# Patient Record
Sex: MALE | Race: WHITE | Employment: OTHER | URBAN - METROPOLITAN AREA
[De-identification: names, ages, dates, MRNs, and addresses within clinical notes are randomized per-mention and may not be internally consistent; named-entity substitution may affect disease eponyms.]

---

## 2020-03-23 ENCOUNTER — ASSISTED LIVING VISIT (OUTPATIENT)
Dept: GERIATRICS | Facility: CLINIC | Age: 80
End: 2020-03-23
Payer: COMMERCIAL

## 2020-03-23 DIAGNOSIS — I73.9 PVD (PERIPHERAL VASCULAR DISEASE) (H): ICD-10-CM

## 2020-03-23 DIAGNOSIS — J44.9 CHRONIC OBSTRUCTIVE PULMONARY DISEASE, UNSPECIFIED COPD TYPE (H): ICD-10-CM

## 2020-03-23 DIAGNOSIS — R57.8 HEMORRHAGIC SHOCK (H): Primary | ICD-10-CM

## 2020-03-23 DIAGNOSIS — N18.30 STAGE 3 CHRONIC KIDNEY DISEASE (H): ICD-10-CM

## 2020-03-23 DIAGNOSIS — E11.9 TYPE 2 DIABETES MELLITUS WITHOUT COMPLICATION, WITHOUT LONG-TERM CURRENT USE OF INSULIN (H): ICD-10-CM

## 2020-03-23 DIAGNOSIS — F33.0 MILD EPISODE OF RECURRENT MAJOR DEPRESSIVE DISORDER (H): ICD-10-CM

## 2020-03-23 DIAGNOSIS — I25.10 ATHEROSCLEROSIS OF NATIVE CORONARY ARTERY OF NATIVE HEART WITHOUT ANGINA PECTORIS: ICD-10-CM

## 2020-03-23 DIAGNOSIS — K26.9 DUODENAL ULCER: ICD-10-CM

## 2020-03-23 PROBLEM — M62.82 RHABDOMYOLYSIS: Status: ACTIVE | Noted: 2020-03-23

## 2020-03-23 PROBLEM — R26.2 DIFFICULTY IN WALKING: Status: ACTIVE | Noted: 2020-03-23

## 2020-03-23 PROBLEM — D62 ACUTE POSTHEMORRHAGIC ANEMIA: Status: ACTIVE | Noted: 2020-03-23

## 2020-03-23 PROBLEM — N18.9 CHRONIC KIDNEY DISEASE: Status: ACTIVE | Noted: 2020-03-23

## 2020-03-23 PROBLEM — K26.0 ACUTE DUODENAL ULCER WITH HEMORRHAGE: Status: ACTIVE | Noted: 2020-03-23

## 2020-03-23 PROBLEM — R68.0 HYPOTHERMIA NOT ASSOCIATED WITH LOW ENVIRONMENTAL TEMPERATURE: Status: ACTIVE | Noted: 2020-03-23

## 2020-03-23 PROBLEM — K92.2 GASTROINTESTINAL HEMORRHAGE: Status: ACTIVE | Noted: 2020-03-23

## 2020-03-23 PROBLEM — N17.9 ACUTE KIDNEY FAILURE (H): Status: ACTIVE | Noted: 2020-03-23

## 2020-03-23 PROBLEM — M62.81 MUSCLE WEAKNESS (GENERALIZED): Status: ACTIVE | Noted: 2020-03-23

## 2020-03-23 PROBLEM — F33.9 RECURRENT MAJOR DEPRESSIVE DISORDER (H): Status: ACTIVE | Noted: 2020-03-23

## 2020-03-23 RX ORDER — POLYETHYLENE GLYCOL 3350 17 G/17G
1 POWDER, FOR SOLUTION ORAL DAILY PRN
COMMUNITY

## 2020-03-23 RX ORDER — BISACODYL 10 MG
10 SUPPOSITORY, RECTAL RECTAL DAILY PRN
COMMUNITY
End: 2020-04-09

## 2020-03-23 RX ORDER — AMLODIPINE BESYLATE 2.5 MG/1
2.5 TABLET ORAL DAILY
COMMUNITY

## 2020-03-23 RX ORDER — MIDODRINE HYDROCHLORIDE 2.5 MG/1
2.5 TABLET ORAL 3 TIMES DAILY PRN
COMMUNITY
End: 2020-04-09

## 2020-03-23 RX ORDER — CLOPIDOGREL BISULFATE 75 MG/1
75 TABLET ORAL DAILY
COMMUNITY

## 2020-03-23 RX ORDER — AMOXICILLIN 250 MG
1 CAPSULE ORAL 2 TIMES DAILY PRN
COMMUNITY

## 2020-03-23 RX ORDER — ATORVASTATIN CALCIUM 40 MG/1
40 TABLET, FILM COATED ORAL DAILY
COMMUNITY

## 2020-03-23 RX ORDER — ALBUTEROL SULFATE 0.83 MG/ML
2.5 SOLUTION RESPIRATORY (INHALATION) EVERY 4 HOURS PRN
COMMUNITY

## 2020-03-23 RX ORDER — LOPERAMIDE HCL 2 MG
2 CAPSULE ORAL EVERY 4 HOURS PRN
COMMUNITY

## 2020-03-23 NOTE — PROGRESS NOTES
[facility]  PRIMARY CARE PROVIDER AND CLINIC:  Luis Jackson, SYEDA CNP, [address] [place] [address] [address] / [facility] [address]  Chief Complaint   Patient presents with     Establish Care     [facility] Medical Record Number:  [id number]  Place of Service where encounter took place:  [facility] ([facility]) [[id number]]    Cali Tucker  is a 79 year old  (1940), admitted to the above facility from [facility]..  Admitted to this facility for  rehab, medical management and nursing care.    HPI:    HPI information obtained from: facility chart records, facility staff, patient report and [facility] chart review.   Brief Summary of Hospital Course: Patient was at [facility] 9/11-9/19 and 9/24-9/26 for GI bleed, duodenal ulcer and hemorrhagic shock, EGD showed non-bleeding ulcers in stomach and duodenum, Hgb dropped to 7.2, transfused total 4u PRBC's, ulcers injected and clipped, stabilized with Hgb's in the 8.6-8.9 range, started on PPI, resumed plavix and ASA, sent to [facility] and followed by Annika team, now has transferred to [facility] and to be followed by [facility].   Updates on Status Since Skilled nursing Admission: Patient resided in own home, transferred to [facility], then to [facility], now at [facility], pleasant, no s/s depression, states numerous times wanting to move back home and drive again, moderate cognitive limitations, poor historian, physically stable albeit weak, previous cigarette smoker quit in September 2019, weight loss, denies current complaints, overall status is stable.     CODE STATUS/ADVANCE DIRECTIVES DISCUSSION:   Per facility chart  Patient's living condition: lives in an assisted living facility  ALLERGIES: Patient has no allergy information on record.  PAST MEDICAL HISTORY:  has a past medical history of Abdominal aortic aneurysm (AAA) (H), Cerebral infarction (H), Hypovolemic shock (H), Major depressive disorder, Personal history of other diseases of the  circulatory system, Personal history of traumatic fracture, Problem related to life-management difficulty, and Urinary retention.  PAST SURGICAL HISTORY:   has no past surgical history on file.  FAMILY HISTORY: family history is not on file.  SOCIAL HISTORY:       Post Discharge Medication Reconciliation Status: discharge medications reconciled, continue medications without change    Current Outpatient Medications   Medication Sig Dispense Refill     albuterol (PROVENTIL) (2.5 MG/3ML) 0.083% neb solution Take 2.5 mg by nebulization 2 times daily And every 4 hours as needed       amLODIPine (NORVASC) 2.5 MG tablet Take 2.5 mg by mouth daily       atorvastatin (LIPITOR) 40 MG tablet Take 40 mg by mouth daily       bisacodyl (DULCOLAX) 10 MG suppository Place 10 mg rectally daily as needed for constipation       clopidogrel (PLAVIX) 75 MG tablet Take 75 mg by mouth daily       loperamide (IMODIUM) 2 MG capsule Take 2 mg by mouth every 4 hours as needed for diarrhea       midodrine (PROAMATINE) 2.5 MG tablet Take 2.5 mg by mouth 3 times daily as needed       polyethylene glycol (MIRALAX) packet Take 1 packet by mouth daily as needed for constipation       senna-docusate (SENOKOT-S/PERICOLACE) 8.6-50 MG tablet Take 1 tablet by mouth 2 times daily as needed for constipation       sertraline (ZOLOFT) 50 MG tablet Take 50 mg by mouth At Bedtime       umeclidinium (INCRUSE ELLIPTA) 62.5 MCG/INH inhaler Inhale 1 puff into the lungs daily           ROS:  4 point ROS including Respiratory, CV, GI and , other than that noted in the HPI,  is negative    Vitals:  There were no vitals taken for this visit.  Exam:  GENERAL APPEARANCE:  Alert, in no distress, appears healthy  ENT:  Mouth and posterior oropharynx normal, moist mucous membranes, normal hearing acuity  RESP:  lungs clear to auscultation   CV:  regular rate and rhythm, no murmur, rub, or gallop, no edema  ABDOMEN:  bowel sounds normal  M/S:   Gait and station abnormal  using WC and walkwer  SKIN:  Inspection of skin and subcutaneous tissueWNL, misc bruising UE's  NEURO:   Examination of sensation by touch normal  PSYCH:  oriented to self, memory impaired     Lab/Diagnostic data:  Labs done in SNF are in [place] EPIC. Please refer to them using EPIC/Care Everywhere.    ASSESSMENT/PLAN:  Hemorrhagic shock (H)  Duodenal ulcer  [facility]  -appetite/intake appropriate  -continue plavix 75mg,   -PPI previously discontinued  -no acute concerns  -Hgb follow up on 3/30    Atherosclerosis of native coronary artery of native heart without angina pectoris  -previous stenting completed in 2017  -statin reduced from 80mg to 40mg  -continue atorvastatin 40mg, plavix 75mg  -follow up cardiology PRN    PVD (peripheral vascular disease) (H)  -GERI's in L SFA and popliteal arteries in 2018  -no edema, extremities perfusing well, warm, PP+, no edema  -staff to follow weights, edema and report issues    Chronic obstructive pulmonary disease, unspecified COPD type (H)  -no SOB, pulmonary status stable  -continue albuterol neb BID and PRN, Ellipta daily  -staff to follow, report SOB    Type 2 diabetes mellitus without complication, without long-term current use of insulin (H)  -historical, no recent A1C on file, no BG's  -A1C on 3/30    Stage 3 chronic kidney disease (H)  -creat/GFR on 9/24/19 were 1.57, 43  -improved on 9/25 ro 0.90/>60  -dose medications renally as possible  -BMP on 3/30    Mild episode of recurrent major depressive disorder (H)  -no overt s/s depression  -previously on wellbutrin, was weaned and dc'd   -previously on depakote, was weaned and dc'd  -patient states wanting to go home and drive again  -continue sertraline 50mg daily  -in-house psych to follow           Electronically signed by:  SYEDA Bynum CNP

## 2020-03-23 NOTE — LETTER
3/23/2020        RE: [facility]  [address] [facility] [address]        [facility]  PRIMARY CARE PROVIDER AND CLINIC:  SYEDA Bynum CNP, [address] [place] [address] [address] / [facility] [address]  Chief Complaint   Patient presents with     Establish Care     [place] Medical Record Number:  [id number]  Place of Service where encounter took place:  [facility] [[id number]]    Cali Tucker  is a 79 year old  (1940), admitted to the above facility from [facility]..  Admitted to this facility for  rehab, medical management and nursing care.    HPI:    HPI information obtained from: facility chart records, facility staff, patient report and [facility] chart review.   Brief Summary of Hospital Course: Patient was at [facility] 9/11-9/19 and 9/24-9/26 for GI bleed, duodenal ulcer and hemorrhagic shock, EGD showed non-bleeding ulcers in stomach and duodenum, Hgb dropped to 7.2, transfused total 4u PRBC's, ulcers injected and clipped, stabilized with Hgb's in the 8.6-8.9 range, started on PPI, resumed plavix and ASA, sent to [facility] and followed by Annika team, now has transferred to [facility] and to be followed by [facility].   Updates on Status Since Skilled nursing Admission: Patient resided in own home, transferred to [facility], then to [facility], now at [facility], pleasant, no s/s depression, states numerous times wanting to move back home and drive again, moderate cognitive limitations, poor historian, physically stable albeit weak, previous cigarette smoker quit in September 2019, weight loss, denies current complaints, overall status is stable.     CODE STATUS/ADVANCE DIRECTIVES DISCUSSION:   Per facility chart  Patient's living condition: lives in an assisted living facility  ALLERGIES: Patient has no allergy information on record.  PAST MEDICAL HISTORY:  has a past medical history of Abdominal aortic aneurysm (AAA) (H),  Cerebral infarction (H), Hypovolemic shock (H), Major depressive disorder, Personal history of other diseases of the circulatory system, Personal history of traumatic fracture, Problem related to life-management difficulty, and Urinary retention.  PAST SURGICAL HISTORY:   has no past surgical history on file.  FAMILY HISTORY: family history is not on file.  SOCIAL HISTORY:       Post Discharge Medication Reconciliation Status: discharge medications reconciled, continue medications without change    Current Outpatient Medications   Medication Sig Dispense Refill     albuterol (PROVENTIL) (2.5 MG/3ML) 0.083% neb solution Take 2.5 mg by nebulization 2 times daily And every 4 hours as needed       amLODIPine (NORVASC) 2.5 MG tablet Take 2.5 mg by mouth daily       atorvastatin (LIPITOR) 40 MG tablet Take 40 mg by mouth daily       bisacodyl (DULCOLAX) 10 MG suppository Place 10 mg rectally daily as needed for constipation       clopidogrel (PLAVIX) 75 MG tablet Take 75 mg by mouth daily       loperamide (IMODIUM) 2 MG capsule Take 2 mg by mouth every 4 hours as needed for diarrhea       midodrine (PROAMATINE) 2.5 MG tablet Take 2.5 mg by mouth 3 times daily as needed       polyethylene glycol (MIRALAX) packet Take 1 packet by mouth daily as needed for constipation       senna-docusate (SENOKOT-S/PERICOLACE) 8.6-50 MG tablet Take 1 tablet by mouth 2 times daily as needed for constipation       sertraline (ZOLOFT) 50 MG tablet Take 50 mg by mouth At Bedtime       umeclidinium (INCRUSE ELLIPTA) 62.5 MCG/INH inhaler Inhale 1 puff into the lungs daily           ROS:  4 point ROS including Respiratory, CV, GI and , other than that noted in the HPI,  is negative    Vitals:  There were no vitals taken for this visit.  Exam:  GENERAL APPEARANCE:  Alert, in no distress, appears healthy  ENT:  Mouth and posterior oropharynx normal, moist mucous membranes, normal hearing acuity  RESP:  lungs clear to auscultation   CV:  regular  rate and rhythm, no murmur, rub, or gallop, no edema  ABDOMEN:  bowel sounds normal  M/S:   Gait and station abnormal using WC and walkwer  SKIN:  Inspection of skin and subcutaneous tissueWNL, misc bruising UE's  NEURO:   Examination of sensation by touch normal  PSYCH:  oriented to self, memory impaired     Lab/Diagnostic data:  Labs done in SNF are in [place] EPIC. Please refer to them using EPIC/Care Everywhere.    ASSESSMENT/PLAN:  Hemorrhagic shock (H)  Duodenal ulcer  -hospitalized [facility] in Sept  -appetite/intake appropriate  -continue plavix 75mg,   -PPI previously discontinued  -no acute concerns  -Hgb follow up on 3/30    Atherosclerosis of native coronary artery of native heart without angina pectoris  -previous stenting completed in 2017  -statin reduced from 80mg to 40mg  -continue atorvastatin 40mg, plavix 75mg  -follow up cardiology PRN    PVD (peripheral vascular disease) (H)  -GERI's in L SFA and popliteal arteries in 2018  -no edema, extremities perfusing well, warm, PP+, no edema  -staff to follow weights, edema and report issues    Chronic obstructive pulmonary disease, unspecified COPD type (H)  -no SOB, pulmonary status stable  -continue albuterol neb BID and PRN, Ellipta daily  -staff to follow, report SOB    Type 2 diabetes mellitus without complication, without long-term current use of insulin (H)  -historical, no recent A1C on file, no BG's  -A1C on 3/30    Stage 3 chronic kidney disease (H)  -creat/GFR on 9/24/19 were 1.57, 43  -improved on 9/25 ro 0.90/>60  -dose medications renally as possible  -BMP on 3/30    Mild episode of recurrent major depressive disorder (H)  -no overt s/s depression  -previously on wellbutrin, was weaned and dc'd   -previously on depakote, was weaned and dc'd  -patient states wanting to go home and drive again  -continue sertraline 50mg daily  -in-house psych to follow           Electronically signed by:  SYEDA Bynum CNP                          Sincerely,        SYEDA Bynum CNP

## 2020-03-30 ENCOUNTER — TRANSFERRED RECORDS (OUTPATIENT)
Dept: HEALTH INFORMATION MANAGEMENT | Facility: CLINIC | Age: 80
End: 2020-03-30

## 2020-03-30 LAB
ANION GAP SERPL CALCULATED.3IONS-SCNC: 10 MMOL/L (ref 7–16)
BUN SERPL-MCNC: 16 MG/DL (ref 7–26)
CALCIUM SERPL-MCNC: 8.3 MG/DL (ref 8.4–10.4)
CHLORIDE SERPLBLD-SCNC: 107 MMOL/L (ref 98–109)
CO2 SERPL-SCNC: 24 MMOL/L (ref 20–29)
CREAT SERPL-MCNC: 0.95 MG/DL (ref 0.73–1.18)
GFR SERPL CREATININE-BSD FRML MDRD: >60 ML/MIN/1.73M2
GLUCOSE SERPL-MCNC: 88 MG/DL (ref 70–100)
HBA1C MFR BLD: 5.2 % (ref 0–5.7)
HEMOGLOBIN: 11.1 G/DL (ref 13.5–17.5)
POTASSIUM SERPL-SCNC: 3.8 MMOL/L (ref 3.5–5.1)
SODIUM SERPL-SCNC: 141 MMOL/L (ref 136–145)

## 2020-03-31 ENCOUNTER — TRANSFERRED RECORDS (OUTPATIENT)
Dept: HEALTH INFORMATION MANAGEMENT | Facility: CLINIC | Age: 80
End: 2020-03-31

## 2020-03-31 LAB — ALT SERPL-CCNC: 17 U/L (ref 0–55)

## 2020-04-08 NOTE — PROGRESS NOTES
"[facility]   Cali Tucker is being evaluated via a billable video visit due to the restrictions of the Covid-19 pandemic.   The patient has been notified of following:  \"This video visit will be conducted via a call between you and your provider. We have found that certain health care needs can be provided without the need for an in-person physical exam.  This service lets us provide the care you need with a video conversation. If during the course of the call the provider feels a video visit is not appropriate, you will not be charged for this service.\"   The provider has received verbal consent for a Video Visit from the patient or first contact? Yes  Patient  or facility staff would like the video invitation sent by: N/A   Video Start Time: 12:35    [place] Medical Record Number:  [id number]  Place of Location at the time of visit: [facility]   Chief Complaint   Patient presents with     P Care Coordination - Initial Contact/New Enrollment       The health plan new enrollment has happened. I have reviewed the  MDS, the preventative needs,  and facility care plan. The level of care is appropriate. I have reviewed the code status/advanced directives.     HPI:  Cali Tucker  is a 79 year old (1940), who is being seen today for a visit.  HPI information obtained from: facility chart records, facility staff, patient report and [facility] chart review. Today's concern is:     Chronic obstructive pulmonary disease, unspecified COPD type (H)  Duodenal ulcer  Mild episode of recurrent major depressive disorder (H)     Patient seen in room with Ipad and nurse Carlin for follow up, pleasant, fairly independent, moderate cognitive limitations, still states wanting to move home and play golf, limited safety awareness, VS stable, intake excellent, no overt s/s depression, no SOB with activity, overall status is stable.     Past Medical and Surgical History reviewed in Epic " today.  MEDICATIONS:    Current Outpatient Medications   Medication Sig Dispense Refill     albuterol (PROVENTIL) (2.5 MG/3ML) 0.083% neb solution Take 2.5 mg by nebulization 2 times daily And every 4 hours as needed       amLODIPine (NORVASC) 2.5 MG tablet Take 2.5 mg by mouth daily       atorvastatin (LIPITOR) 40 MG tablet Take 40 mg by mouth daily       bisacodyl (DULCOLAX) 10 MG suppository Place 10 mg rectally daily as needed for constipation       clopidogrel (PLAVIX) 75 MG tablet Take 75 mg by mouth daily       loperamide (IMODIUM) 2 MG capsule Take 2 mg by mouth every 4 hours as needed for diarrhea       midodrine (PROAMATINE) 2.5 MG tablet Take 2.5 mg by mouth 3 times daily as needed       polyethylene glycol (MIRALAX) packet Take 1 packet by mouth daily as needed for constipation       senna-docusate (SENOKOT-S/PERICOLACE) 8.6-50 MG tablet Take 1 tablet by mouth 2 times daily as needed for constipation       sertraline (ZOLOFT) 50 MG tablet Take 50 mg by mouth At Bedtime       umeclidinium (INCRUSE ELLIPTA) 62.5 MCG/INH inhaler Inhale 1 puff into the lungs daily       REVIEW OF SYSTEMS: 4 point ROS including Respiratory, CV, GI and , other than that noted in the HPI,  is negative  Objective: There were no vitals taken for this visit.  Limited visit exam done given COVID-19 precautions.   GENERAL APPEARANCE:  Alert, in no distress, appears healthy  ENT:  Mouth and posterior oropharynx normal, moist mucous membranes  RESP:  lungs clear to auscultation   CV:  regular rate and rhythm, no murmur, rub, or gallop, no edema  ABDOMEN:  bowel sounds normal  M/S:   Gait and station abnormal using walker  SKIN:  Inspection of skin and subcutaneous tissue baseline  NEURO:   Examination of sensation by touch normal  PSYCH:  oriented to self, memory impaired   Labs:   Labs done in SNF are in [place] EPIC. Please refer to them using [place]/Care Everywhere.    ASSESSMENT/PLAN:  Chronic obstructive pulmonary disease,  unspecified COPD type (H)  -no SOB even with activity  -continue incruse daily  -staff to monitor status  -changing albuterol neb to PRN only    Duodenal ulcer  -no gut issues, adequate intake and output  -staff to monitor    Mild episode of recurrent major depressive disorder (H)  -no overt s/s depression, previously weaned depakote and wellbutrin  -status stable  -consider PHQ9/GDS testing as indicated      Electronically signed by:  SYEDA Bynum CNP     Video-Visit Details  Type of service:  Video Visit  Video End Time (time video stopped): 12:45  Distant Location (provider location):  [facility]

## 2020-04-09 ENCOUNTER — VIRTUAL VISIT (OUTPATIENT)
Dept: GERIATRICS | Facility: CLINIC | Age: 80
End: 2020-04-09
Payer: COMMERCIAL

## 2020-04-09 DIAGNOSIS — K26.9 DUODENAL ULCER: ICD-10-CM

## 2020-04-09 DIAGNOSIS — J44.9 CHRONIC OBSTRUCTIVE PULMONARY DISEASE, UNSPECIFIED COPD TYPE (H): Primary | ICD-10-CM

## 2020-04-09 DIAGNOSIS — F33.0 MILD EPISODE OF RECURRENT MAJOR DEPRESSIVE DISORDER (H): ICD-10-CM

## 2020-04-09 NOTE — LETTER
"    4/9/2020        RE: [facility]  [address] [facility] [address]        [place] [facility]   Cali Tucker is being evaluated via a billable video visit due to the restrictions of the Covid-19 pandemic.   The patient has been notified of following:  ***\"This video visit will be conducted via a call between you and your provider. We have found that certain health care needs can be provided without the need for an in-person physical exam.  This service lets us provide the care you need with a video conversation. If during the course of the call the provider feels a video visit is not appropriate, you will not be charged for this service.\"   The provider has received verbal consent for a Video Visit from the patient or first contact? {YES-NO  Default Yes:4444::\"Yes\"}  Patient  or facility staff would like the video invitation sent by: {[facility]gs video visit invitation:212871::\"N/A \"}  Video Start Time: {video visit start time:152948}    [place] Medical Record Number:  [id number]  Place of Location at the time of visit: [facility]   Chief Complaint   Patient presents with     [facility] Care Coordination - Initial Contact/New Enrollment       The health plan new enrollment has happened. I have reviewed the  MDS, the preventative needs,  and facility care plan. The level of care is appropriate. I have reviewed the code status/advanced directives.     HPI:  Cali Tucker  is a 79 year old (1940), who is being seen today for a visit.  HPI information obtained from: {[facility] HPI:988165}. Today's concern is:  {[facility] DX:486633}    Past Medical and Surgical History reviewed in Epic today.  MEDICATIONS:  {Providers Please double check the med list (in the plan section >> meds & orders tab) and Discontinue any of the meds flagged by the TC to be discontinued}  Current Outpatient Medications   Medication Sig Dispense Refill     albuterol (PROVENTIL) (2.5 MG/3ML) 0.083% neb " solution Take 2.5 mg by nebulization 2 times daily And every 4 hours as needed       amLODIPine (NORVASC) 2.5 MG tablet Take 2.5 mg by mouth daily       atorvastatin (LIPITOR) 40 MG tablet Take 40 mg by mouth daily       bisacodyl (DULCOLAX) 10 MG suppository Place 10 mg rectally daily as needed for constipation       clopidogrel (PLAVIX) 75 MG tablet Take 75 mg by mouth daily       loperamide (IMODIUM) 2 MG capsule Take 2 mg by mouth every 4 hours as needed for diarrhea       midodrine (PROAMATINE) 2.5 MG tablet Take 2.5 mg by mouth 3 times daily as needed       polyethylene glycol (MIRALAX) packet Take 1 packet by mouth daily as needed for constipation       senna-docusate (SENOKOT-S/PERICOLACE) 8.6-50 MG tablet Take 1 tablet by mouth 2 times daily as needed for constipation       sertraline (ZOLOFT) 50 MG tablet Take 50 mg by mouth At Bedtime       umeclidinium (INCRUSE ELLIPTA) 62.5 MCG/INH inhaler Inhale 1 puff into the lungs daily     ***  REVIEW OF SYSTEMS: {ROS FGS:279269}  Objective: There were no vitals taken for this visit.  Limited visit exam done given COVID-19 precautions.   {Nursing home physical exam :563297}  Labs:   {fgslab:640166}    ASSESSMENT/PLAN:  {F[facility] DX:700311}    {fgsorders:048887}  ***  Electronically signed by:  Keila Moya ***  {Providers Please double check the med list (in the plan section >> meds & orders tab) and Discontinue any of the meds flagged by the TC to be discontinued}  Video-Visit Details  Type of service:  Video Visit  Video End Time (time video stopped): ***  Distant Location (provider location):  [place] [facility]             Sincerely,        SYEDA Bynum CNP

## 2020-04-20 ENCOUNTER — VIRTUAL VISIT (OUTPATIENT)
Dept: GERIATRICS | Facility: CLINIC | Age: 80
End: 2020-04-20
Payer: COMMERCIAL

## 2020-04-20 DIAGNOSIS — Z91.89 AT RISK FOR ELOPEMENT: Primary | ICD-10-CM

## 2020-04-20 DIAGNOSIS — R41.89 COGNITIVE DECLINE: ICD-10-CM

## 2020-04-20 NOTE — LETTER
"    4/20/2020        RE: [facility]  [address] [facility] [address]        Telephone visit  [facility] [facility]  Cali Tucker is a 79 year old year old adult who is being evaluated via a billable telephone visit due to the restrictions of the Covid-19 pandemic. The patient has been notified of following: ***\"This telephone visit will be conducted via a call between you and your provider. We have found that certain health care needs can be provided without the need for a physical exam. This service lets us provide the care you need with a short phone conversation. If a prescription is necessary we will work with the facility you are at and can send it directly to your pharmacy. If lab work is needed we can place an order to have it drawn at the facility you are at. If during the course of the call the provider feels a telephone visit is not appropriate, you will not be charged for this service.\"   Patient or Patient's first contact has given verbal consent for Telephone visit?  {YES-NO  Default Yes:4444::\"Yes\"}  Place of Location at the time of visit[facility]     Cali Tucker complains of :   Chief Complaint   Patient presents with     RECHECK     I have reviewed and updated the patient's Past Medical History, Social History, Family History and Medication List.  ALLERGIES: No Known Allergies   HPI: HPI information obtained from: {[facility] HPI:473107}.  ***    MEDICATIONS:  {Providers Please double check the med list (in the plan section >> meds & orders tab) and Discontinue any of the meds flagged by the TC to be discontinued}  Current Outpatient Medications   Medication Sig Dispense Refill     albuterol (PROVENTIL) (2.5 MG/3ML) 0.083% neb solution Take 2.5 mg by nebulization every 4 hours as needed         amLODIPine (NORVASC) 2.5 MG tablet Take 2.5 mg by mouth daily       atorvastatin (LIPITOR) 40 MG tablet Take 40 mg by mouth daily   "     clopidogrel (PLAVIX) 75 MG tablet Take 75 mg by mouth daily       loperamide (IMODIUM) 2 MG capsule Take 2 mg by mouth every 4 hours as needed for diarrhea       polyethylene glycol (MIRALAX) packet Take 1 packet by mouth daily as needed for constipation       senna-docusate (SENOKOT-S/PERICOLACE) 8.6-50 MG tablet Take 1 tablet by mouth 2 times daily as needed for constipation       sertraline (ZOLOFT) 50 MG tablet Take 50 mg by mouth At Bedtime       umeclidinium (INCRUSE ELLIPTA) 62.5 MCG/INH inhaler Inhale 1 puff into the lungs daily     ***  REVIEW OF SYSTEMS: {ROS FGS:403007}  Objective: There were no vitals taken for this visit.  Limited visit exam done given COVID-19 precautions.   ***  Labs:   {fgslab:520811}  Assessment/Plan:  {Diagnosis, Associated Orders and Comment:052026}    Phone call duration:  *** minutes  Keila Moya   {34967: 5-10 minutes  50852: 11-20 minutes  68323: 21-30 minutes  Delete this once you know what billing code you will need}              Sincerely,        Luis Jackson, SYEDA CNP

## 2020-04-20 NOTE — PROGRESS NOTES
"Telephone visit  [facility]  Cali Tucker is a 79 year old year old adult who is being evaluated via a billable telephone visit due to the restrictions of the Covid-19 pandemic. The patient has been notified of following: \"This telephone visit will be conducted via a call between you and your provider. We have found that certain health care needs can be provided without the need for a physical exam. This service lets us provide the care you need with a short phone conversation. If a prescription is necessary we will work with the facility you are at and can send it directly to your pharmacy. If lab work is needed we can place an order to have it drawn at the facility you are at. If during the course of the call the provider feels a telephone visit is not appropriate, you will not be charged for this service.\"   Patient or Patient's first contact has given verbal consent for Telephone visit?  Yes  Place of Location at the time of visit: [facility]     Cali Tucker complains of :   Chief Complaint   Patient presents with     RECHECK     I have reviewed and updated the patient's Past Medical History, Social History, Family History and Medication List.  ALLERGIES: No Known Allergies   HPI: HPI information obtained from: facility chart records, facility staff, patient report and [place] Epic chart review.      Called patient, spoke about not leaving until Petty has things setup, still threatening to leave, even states may be living with an old friend moving up from [place], is causing friction with family with yelling and swearing, has the physical ability to exit the premises, also left a message with daughter Petty to get plan in place ASAP as patient will exit at some juncture and would prefer a scheduled exit with home care services setup prior, instructed patient to 1) communicate better with family 2) stay in [facility] until daughter has date for transfer back " home.    MEDICATIONS:    Current Outpatient Medications   Medication Sig Dispense Refill     albuterol (PROVENTIL) (2.5 MG/3ML) 0.083% neb solution Take 2.5 mg by nebulization every 4 hours as needed         amLODIPine (NORVASC) 2.5 MG tablet Take 2.5 mg by mouth daily       atorvastatin (LIPITOR) 40 MG tablet Take 40 mg by mouth daily       clopidogrel (PLAVIX) 75 MG tablet Take 75 mg by mouth daily       loperamide (IMODIUM) 2 MG capsule Take 2 mg by mouth every 4 hours as needed for diarrhea       polyethylene glycol (MIRALAX) packet Take 1 packet by mouth daily as needed for constipation       senna-docusate (SENOKOT-S/PERICOLACE) 8.6-50 MG tablet Take 1 tablet by mouth 2 times daily as needed for constipation       sertraline (ZOLOFT) 50 MG tablet Take 50 mg by mouth At Bedtime       umeclidinium (INCRUSE ELLIPTA) 62.5 MCG/INH inhaler Inhale 1 puff into the lungs daily       REVIEW OF SYSTEMS: deferred  Objective: There were no vitals taken for this visit.  Limited visit exam done given COVID-19 precautions.     Labs:   Labs done in SNF are in [place] EPIC. Please refer to them using EPIC/Care Everywhere.  Assessment/Plan:  1. At risk for elopement  2. Cognitive decline  -patient plans to leave [facility] facility and move back home ASAP  -see HPI, instructed to stay in place until daughter has home services setup       Phone call duration:  13 minutes  SYEDA Bynum CNP

## 2020-04-21 ENCOUNTER — VIRTUAL VISIT (OUTPATIENT)
Dept: GERIATRICS | Facility: CLINIC | Age: 80
End: 2020-04-21
Payer: COMMERCIAL

## 2020-04-21 DIAGNOSIS — K92.2 GASTROINTESTINAL HEMORRHAGE, UNSPECIFIED GASTROINTESTINAL HEMORRHAGE TYPE: Primary | ICD-10-CM

## 2020-04-21 DIAGNOSIS — R41.89 COGNITIVE DECLINE: ICD-10-CM

## 2020-04-21 DIAGNOSIS — R41.3 MEMORY LOSS: ICD-10-CM

## 2020-04-21 NOTE — LETTER
"    4/21/2020        RE: [facility]  [address] [facility] [address]        Telephone visit  [facility]  Cali Tucker is a 79 year old year old adult who is being evaluated via a billable telephone visit due to the restrictions of the Covid-19 pandemic. The patient has been notified of following: \"This telephone visit will be conducted via a call between you and your provider. We have found that certain health care needs can be provided without the need for a physical exam. This service lets us provide the care you need with a short phone conversation. If a prescription is necessary we will work with the facility you are at and can send it directly to your pharmacy. If lab work is needed we can place an order to have it drawn at the facility you are at. If during the course of the call the provider feels a telephone visit is not appropriate, you will not be charged for this service.\"   Patient or Patient's first contact has given verbal consent for Telephone visit?  Yes  Place of Location at the time of visit: [facility]     Cali Tucker complains of :   Chief Complaint   Patient presents with     RECHECK     Routine     I have reviewed and updated the patient's Past Medical History, Social History, Family History and Medication List.  ALLERGIES: No Known Allergies   HPI: HPI information obtained from: facility chart records, facility staff, patient report and [facility] chart review.      Spoke with patient, daughter Petty at length RE: plan for Cali to return home on 5/2, she does not plan to clean the home first, setting up home care 3x/wk, obtaining an alert pendant, plan to submit a [facility] at some point after he leaves, no care conference indicated, suggested making a calendar with things that need to be done prior to returning home and give to him as a reminder of plan, goal is to keep from leaving the facility until Petty " can get things done and transport.    MEDICATIONS:    Current Outpatient Medications   Medication Sig Dispense Refill     albuterol (PROVENTIL) (2.5 MG/3ML) 0.083% neb solution Take 2.5 mg by nebulization every 4 hours as needed         amLODIPine (NORVASC) 2.5 MG tablet Take 2.5 mg by mouth daily       atorvastatin (LIPITOR) 40 MG tablet Take 40 mg by mouth daily       clopidogrel (PLAVIX) 75 MG tablet Take 75 mg by mouth daily       loperamide (IMODIUM) 2 MG capsule Take 2 mg by mouth every 4 hours as needed for diarrhea       polyethylene glycol (MIRALAX) packet Take 1 packet by mouth daily as needed for constipation       senna-docusate (SENOKOT-S/PERICOLACE) 8.6-50 MG tablet Take 1 tablet by mouth 2 times daily as needed for constipation       sertraline (ZOLOFT) 50 MG tablet Take 50 mg by mouth At Bedtime       umeclidinium (INCRUSE ELLIPTA) 62.5 MCG/INH inhaler Inhale 1 puff into the lungs daily       REVIEW OF SYSTEMS: deferred  Objective: There were no vitals taken for this visit.  Limited visit exam done given COVID-19 precautions.     Labs:   Labs done in SNF are in [place] EPIC. Please refer to them using [email or web address].  Assessment/Plan:  1. Gastrointestinal hemorrhage, unspecified gastrointestinal hemorrhage type  2. Memory loss  3. Cognitive decline  -patient plan to return home on 5/2, unsure if he will stay in place, does have wallet and money on person  -continue med regimen daily to simplify  -see HPI for additional details      Phone call duration:  15 minutes  SYEDA Bynum CNP                 Sincerely,        SYEDA Bynum CNP

## 2020-04-21 NOTE — PROGRESS NOTES
"Telephone visit  [facility]  Cali Tucker is a 79 year old year old adult who is being evaluated via a billable telephone visit due to the restrictions of the Covid-19 pandemic. The patient has been notified of following: \"This telephone visit will be conducted via a call between you and your provider. We have found that certain health care needs can be provided without the need for a physical exam. This service lets us provide the care you need with a short phone conversation. If a prescription is necessary we will work with the facility you are at and can send it directly to your pharmacy. If lab work is needed we can place an order to have it drawn at the facility you are at. If during the course of the call the provider feels a telephone visit is not appropriate, you will not be charged for this service.\"   Patient or Patient's first contact has given verbal consent for Telephone visit?  Yes  Place of Location at the time of visit: [facility]     Cali Tucker complains of :   Chief Complaint   Patient presents with     RECHECK     Routine     I have reviewed and updated the patient's Past Medical History, Social History, Family History and Medication List.  ALLERGIES: No Known Allergies   HPI: HPI information obtained from: facility chart records, facility staff, patient report and [place] Epic chart review.      Spoke with patient, daughter Petty at length RE: plan for Cali to return home on 5/2, she does not plan to clean the home first, setting up home care 3x/wk, obtaining an alert pendant, plan to submit a VA at some point after he leaves, no care conference indicated, suggested making a calendar with things that need to be done prior to returning home and give to him as a reminder of plan, goal is to keep from leaving the facility until Petty can get things done and transport.    MEDICATIONS:    Current Outpatient Medications   Medication Sig Dispense Refill     " albuterol (PROVENTIL) (2.5 MG/3ML) 0.083% neb solution Take 2.5 mg by nebulization every 4 hours as needed         amLODIPine (NORVASC) 2.5 MG tablet Take 2.5 mg by mouth daily       atorvastatin (LIPITOR) 40 MG tablet Take 40 mg by mouth daily       clopidogrel (PLAVIX) 75 MG tablet Take 75 mg by mouth daily       loperamide (IMODIUM) 2 MG capsule Take 2 mg by mouth every 4 hours as needed for diarrhea       polyethylene glycol (MIRALAX) packet Take 1 packet by mouth daily as needed for constipation       senna-docusate (SENOKOT-S/PERICOLACE) 8.6-50 MG tablet Take 1 tablet by mouth 2 times daily as needed for constipation       sertraline (ZOLOFT) 50 MG tablet Take 50 mg by mouth At Bedtime       umeclidinium (INCRUSE ELLIPTA) 62.5 MCG/INH inhaler Inhale 1 puff into the lungs daily       REVIEW OF SYSTEMS: deferred  Objective: There were no vitals taken for this visit.  Limited visit exam done given COVID-19 precautions.     Labs:   Labs done in SNF are in [place] EPIC. Please refer to them using [place]/Care Everywhere.  Assessment/Plan:  1. Gastrointestinal hemorrhage, unspecified gastrointestinal hemorrhage type  2. Memory loss  3. Cognitive decline  -patient plan to return home on 5/2, unsure if he will stay in place, does have wallet and money on person  -continue med regimen daily to simplify  -see HPI for additional details      Phone call duration:  15 minutes  SYEDA Bynum CNP

## 2020-04-23 ENCOUNTER — PATIENT OUTREACH (OUTPATIENT)
Dept: GERIATRIC MEDICINE | Facility: CLINIC | Age: 80
End: 2020-04-23

## 2020-04-24 NOTE — PROGRESS NOTES
[employer] Medicare Initial enrollment    Member was assigned to [facility] for [employer] Medicare Case Management on: 4-1-20  Review of member's Epic chart completed.  No triggering events notified.  Follow up as needed.    Petty Rae MA [facility] Care Coordinator   [phone]

## 2020-06-30 ENCOUNTER — PATIENT OUTREACH (OUTPATIENT)
Dept: GERIATRIC MEDICINE | Facility: CLINIC | Age: 80
End: 2020-06-30

## 2020-07-03 NOTE — PROGRESS NOTES
[employer] Medicare Disenrollment    Member was disenrolled from [employer] Medicare effective: 6-30-20  Reason for disenrollment: Change in Provider     Member moved back into the community   Petty ROGER   [facility] Care Coordinator   [phone] - work cell phone   [phone] - work fax    '